# Patient Record
Sex: MALE | Race: WHITE
[De-identification: names, ages, dates, MRNs, and addresses within clinical notes are randomized per-mention and may not be internally consistent; named-entity substitution may affect disease eponyms.]

---

## 2018-03-21 ENCOUNTER — HOSPITAL ENCOUNTER (OUTPATIENT)
Dept: HOSPITAL 25 - OR | Age: 33
Discharge: HOME | End: 2018-03-21
Attending: OTOLARYNGOLOGY
Payer: COMMERCIAL

## 2018-03-21 VITALS — DIASTOLIC BLOOD PRESSURE: 99 MMHG | SYSTOLIC BLOOD PRESSURE: 139 MMHG

## 2018-03-21 DIAGNOSIS — J31.0: ICD-10-CM

## 2018-03-21 DIAGNOSIS — J32.0: Primary | ICD-10-CM

## 2018-03-21 DIAGNOSIS — J34.3: ICD-10-CM

## 2018-03-21 DIAGNOSIS — J33.0: ICD-10-CM

## 2018-04-27 NOTE — OP
DATE OF OPERATION:  03/21/18 - [facility]

 

DATE OF BIRTH:  [birth year]

 

SURGEON:  Raf Ac M.D.

 

PRE-OP DIAGNOSES:  Chronic sinusitis, nasal dyspnea.

 

POST-OP DIAGNOSES:  Chronic sinusitis, nasal dyspnea.

 

OPERATIVE PROCEDURE:  Bilateral video endoscopic maxillary antrostomy and 
anterior ethmoidectomy and bilateral submucosal resection of the inferior 
turbinates.

 

BRIEF HISTORY:  This pleasant 32-year-old gentleman presenting with symptoms of 
nasal congestion, rhinorrhea, postnasal drainage.  Symptoms suggestive of 
sinusitis including congestion and rhinorrhea, not resolving with nasal 
medication for prolonged periods of time and also occasional use of oral 
steroids, presently taking oral decongestant.

 

DESCRIPTION OF PROCEDURE:  The patient was taken to the operating room, general 
anesthetic was given, and the patient was intubated   The nose was decongested 
with Afrin placed pledgets.  Subsequently 2% lidocaine with epinephrine was 
utilized for anesthesia and 0-degree telescope, 30-degree telescope, and other 
endoscopic sinus surgery instruments including the microshaver were utilized.  2
% lidocaine was infiltrated on uncinate process on both sides and into the 
middle turbinate region as well as the lateral nasal wall.  Once adequate 
anesthesia and decongestion was carried out.  We initially examined the left 
side.  The uncinate process was peeled out anteriorly, the microshaver was used 
to remove it.  Antrostomy was then enlarged using the microshaver.  Subsequently
, ethmoidal resection was carried out of the ethmoidal bulla using the 
microshaver coursing towards ground lamella posteriorly and towards lamina 
papyracea laterally and superiorly into nasofrontal duct area.  Once adequate 
resection was carried out, Gelfilm was used as a spacer between the middle 
turbinate and lateral nasal wall.  Next, we turned our attention to the right 
side again.  The uncinate process was peeled out anteriorly. Subsequently micro 
shaved away.  The antrostomy was enlarged and then subsequently ethmoidectomy 
was carried out utilizing the microshaver to resect the ethmoidal bulla and 
laterally towards lamina  papyracea.  Once adequate resection was carried out, 
the area was packed with Gelfilm/Gelfoam.  I then turned our attention to the 
inferior turbinates.  Submucosal resection was carried out by using the cautery 
and resecting some of the bone and subsequently cauterizing the inferior 
turbinates on both sides.  Once adequate resection was carried out, the patient'
s nose was gently dressed with just an external dressing.  The patient was then 
awakened, extubated, and sent to the recovery room in stable condition.  
Instrument and sponge counts were correct.  Blood loss was minimal.

 

 [id number]/[facility] #: [id number]

MTDD